# Patient Record
Sex: MALE | Race: WHITE | NOT HISPANIC OR LATINO
[De-identification: names, ages, dates, MRNs, and addresses within clinical notes are randomized per-mention and may not be internally consistent; named-entity substitution may affect disease eponyms.]

---

## 2024-05-20 ENCOUNTER — NON-APPOINTMENT (OUTPATIENT)
Age: 69
End: 2024-05-20

## 2024-05-22 ENCOUNTER — TRANSCRIPTION ENCOUNTER (OUTPATIENT)
Age: 69
End: 2024-05-22

## 2024-05-22 PROBLEM — Z00.00 ENCOUNTER FOR PREVENTIVE HEALTH EXAMINATION: Status: ACTIVE | Noted: 2024-05-22

## 2024-06-05 ENCOUNTER — APPOINTMENT (OUTPATIENT)
Dept: NEUROLOGY | Facility: CLINIC | Age: 69
End: 2024-06-05
Payer: MEDICARE

## 2024-06-05 ENCOUNTER — NON-APPOINTMENT (OUTPATIENT)
Age: 69
End: 2024-06-05

## 2024-06-05 VITALS
SYSTOLIC BLOOD PRESSURE: 126 MMHG | HEIGHT: 67 IN | OXYGEN SATURATION: 98 % | BODY MASS INDEX: 25.58 KG/M2 | WEIGHT: 163 LBS | HEART RATE: 50 BPM | DIASTOLIC BLOOD PRESSURE: 68 MMHG

## 2024-06-05 DIAGNOSIS — Z86.39 PERSONAL HISTORY OF OTHER ENDOCRINE, NUTRITIONAL AND METABOLIC DISEASE: ICD-10-CM

## 2024-06-05 DIAGNOSIS — G40.109 LOCALIZATION-RELATED (FOCAL) (PARTIAL) SYMPTOMATIC EPILEPSY AND EPILEPTIC SYNDROMES WITH SIMPLE PARTIAL SEIZURES, NOT INTRACTABLE, W/OUT STATUS EPILEPTICUS: ICD-10-CM

## 2024-06-05 PROCEDURE — 99204 OFFICE O/P NEW MOD 45 MIN: CPT

## 2024-06-05 RX ORDER — MIDAZOLAM 5 MG/.1ML
5 SPRAY NASAL
Qty: 1 | Refills: 0 | Status: ACTIVE | COMMUNITY
Start: 2024-06-05 | End: 1900-01-01

## 2024-06-05 RX ORDER — LEVETIRACETAM 750 MG/1
750 TABLET, FILM COATED ORAL
Qty: 120 | Refills: 1 | Status: ACTIVE | COMMUNITY
Start: 2024-06-05 | End: 1900-01-01

## 2024-06-05 RX ORDER — LEVETIRACETAM 750 MG/1
750 TABLET, FILM COATED ORAL
Qty: 180 | Refills: 0 | Status: DISCONTINUED | COMMUNITY
Start: 2024-06-05 | End: 2024-06-05

## 2024-06-06 PROBLEM — Z86.39 HISTORY OF HYPOTHYROIDISM: Status: RESOLVED | Noted: 2024-06-06 | Resolved: 2024-06-06

## 2024-06-06 PROBLEM — Z86.39 HISTORY OF HYPERLIPIDEMIA: Status: RESOLVED | Noted: 2024-06-06 | Resolved: 2024-06-06

## 2024-06-06 RX ORDER — MULTIVITAMIN
TABLET ORAL
Refills: 0 | Status: ACTIVE | COMMUNITY

## 2024-06-06 RX ORDER — SIMVASTATIN 20 MG/1
20 TABLET, FILM COATED ORAL
Refills: 0 | Status: ACTIVE | COMMUNITY

## 2024-06-06 RX ORDER — ASPIRIN 81 MG
81 TABLET, DELAYED RELEASE (ENTERIC COATED) ORAL
Refills: 0 | Status: ACTIVE | COMMUNITY

## 2024-06-06 RX ORDER — FENOFIBRATE 48 MG/1
48 TABLET ORAL
Refills: 0 | Status: ACTIVE | COMMUNITY

## 2024-06-06 RX ORDER — LEVOTHYROXINE SODIUM 0.07 MG/1
75 TABLET ORAL
Refills: 0 | Status: ACTIVE | COMMUNITY

## 2024-06-06 NOTE — DATA REVIEWED
[de-identified] : cEEG 5/20-5/21/24: Ambulatory EEG: 3 electroclinical seizures, two of left temporal onset one of right temporal onset.  Frequent right frontotemporal spikes and sharp waves. Frequent intermittent right frontotemporal polymorphic delta slowing.   [de-identified] : MRI brain w/o contrast: 5/21/24: No acute infarction. No gross evidence for cortical dysplasia, gray matter heterotopia, or mesial temporal sclerosis. Mild left hippocampal volume loss compared to contralateral side.

## 2024-06-06 NOTE — ASSESSMENT
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 Call if seizure [phone] and call if 911 if seizure greater than 5 minutes   Seizure Safety:  Wear a helmet when biking or horseback riding No unsupervised swimming Showers rather than baths - no bath alone - risk of drowning Adjust the temperature on hot water heater to avoid scalding If uncontrolled seizures, use microwave rather than stovetop Dont lock door in bathroom, bedroom or on places If fall off bed with seizures, try sleeping with mattress on the floor Use soft or padded furniture Avoid high ladders Take medication as prescribed Follow driving regulations of people with epilepsy. Please visit Epilepsy [facility] : [email or web address]

## 2024-06-06 NOTE — HISTORY OF PRESENT ILLNESS
[FreeTextEntry1] : Juan is a 68-year-old right-handed man with a past medical history of hyperlipidemia and hypothyroidism presenting for post-hospital follow-up after a first time focal seizure.  Per report of wife, he was in usual state of health on 5/20. He drove to a meeting, came home. Was out on the deck. He was speaking with her and suddenly froze. One hand (right hand per hospital notes) was shaking. This lasted for about one minute. He almost lost his balance. Eyes were open and he was making purposeless chewing movements with his mouth. No shaking was noted. He felt weak.  She thinks he may have had two episodes of staring and responding. Per [facility] ED notes, one episode on Mother's Day and another episode in March. These were less dramatic and wife did not make much of them.   Of note, he has had multiple arousals from sleep. He suddenly jerks up from sleep. Wife does not know if these were small seizures. Has been under stress. Retired.    He was admitted to [facility] from 5/20-5/22/24.   No history of major convulsions. No abnormal smell. No abnormal taste. No malinda vu.    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

## 2024-06-06 NOTE — PHYSICAL EXAM
[FreeTextEntry1] : Mental Status:  alert, oriented.  Language/Speech : speech fluent  Cranial Nerves  II: Pupils equal and reactive,  VFF full III, IV, VI: EOMI V : normal sensation in V1-V3 b/l VII : no asymmetry, no nasolabial fold flattening VIII: normal hearing to voice  IX, X: normal palatal elevation, uvula midline XI: 5/5 head turn and 5/5 shoulder shrug bilaterally XII : midline tongue protrusion Motor: no drift in limbs, normal bulk and tone, 5/5 in all extremities Sensory: normal to light touch, pinprick and vibration Reflexes: brachioradialis, biceps, triceps, patella and ankles - symmetric  Toes are down-going  Coordination: Normal finger to nose Gait: normal balance and gait, able to toe/heel/tandem

## 2024-06-18 ENCOUNTER — APPOINTMENT (OUTPATIENT)
Dept: NEUROLOGY | Facility: CLINIC | Age: 69
End: 2024-06-18
Payer: MEDICARE

## 2024-06-18 PROCEDURE — 95819 EEG AWAKE AND ASLEEP: CPT

## 2024-06-19 ENCOUNTER — APPOINTMENT (OUTPATIENT)
Dept: NEUROLOGY | Facility: CLINIC | Age: 69
End: 2024-06-19

## 2024-06-19 PROCEDURE — 95700 EEG CONT REC W/VID EEG TECH: CPT

## 2024-06-19 PROCEDURE — 95719 EEG PHYS/QHP EA INCR W/O VID: CPT

## 2024-06-19 PROCEDURE — 95708 EEG WO VID EA 12-26HR UNMNTR: CPT

## 2024-08-30 ENCOUNTER — RX RENEWAL (OUTPATIENT)
Age: 69
End: 2024-08-30

## 2024-10-07 ENCOUNTER — APPOINTMENT (OUTPATIENT)
Dept: NEUROLOGY | Facility: CLINIC | Age: 69
End: 2024-10-07
Payer: MEDICARE

## 2024-10-07 ENCOUNTER — NON-APPOINTMENT (OUTPATIENT)
Age: 69
End: 2024-10-07

## 2024-10-07 VITALS
SYSTOLIC BLOOD PRESSURE: 142 MMHG | DIASTOLIC BLOOD PRESSURE: 76 MMHG | WEIGHT: 163 LBS | HEIGHT: 67 IN | HEART RATE: 46 BPM | BODY MASS INDEX: 25.58 KG/M2 | OXYGEN SATURATION: 97 %

## 2024-10-07 PROCEDURE — 99214 OFFICE O/P EST MOD 30 MIN: CPT

## 2024-10-07 RX ORDER — LAMOTRIGINE 25 MG/1
25 TABLET ORAL
Qty: 240 | Refills: 0 | Status: ACTIVE | COMMUNITY
Start: 2024-10-07 | End: 1900-01-01

## 2024-11-12 ENCOUNTER — APPOINTMENT (OUTPATIENT)
Dept: NEUROLOGY | Facility: CLINIC | Age: 69
End: 2024-11-12

## 2024-11-12 PROCEDURE — 95816 EEG AWAKE AND DROWSY: CPT

## 2024-11-13 ENCOUNTER — APPOINTMENT (OUTPATIENT)
Dept: NEUROLOGY | Facility: CLINIC | Age: 69
End: 2024-11-13

## 2024-11-13 PROCEDURE — 95719 EEG PHYS/QHP EA INCR W/O VID: CPT

## 2024-11-13 PROCEDURE — 95708 EEG WO VID EA 12-26HR UNMNTR: CPT

## 2024-11-13 PROCEDURE — 95700 EEG CONT REC W/VID EEG TECH: CPT

## 2024-12-02 ENCOUNTER — NON-APPOINTMENT (OUTPATIENT)
Age: 69
End: 2024-12-02

## 2024-12-02 RX ORDER — LAMOTRIGINE 100 MG/1
100 TABLET ORAL
Qty: 60 | Refills: 3 | Status: ACTIVE | COMMUNITY
Start: 2024-12-02 | End: 1900-01-01

## 2024-12-12 ENCOUNTER — NON-APPOINTMENT (OUTPATIENT)
Age: 69
End: 2024-12-12

## 2024-12-12 RX ORDER — LAMOTRIGINE 25 MG/1
25 TABLET ORAL
Qty: 120 | Refills: 1 | Status: ACTIVE | COMMUNITY
Start: 2024-12-12 | End: 1900-01-01

## 2025-01-15 ENCOUNTER — NON-APPOINTMENT (OUTPATIENT)
Age: 70
End: 2025-01-15

## 2025-01-15 RX ORDER — LAMOTRIGINE 150 MG/1
150 TABLET ORAL TWICE DAILY
Qty: 180 | Refills: 3 | Status: ACTIVE | COMMUNITY
Start: 2025-01-15 | End: 1900-01-01

## 2025-01-28 ENCOUNTER — APPOINTMENT (OUTPATIENT)
Dept: NEUROLOGY | Facility: CLINIC | Age: 70
End: 2025-01-28
Payer: MEDICARE

## 2025-01-28 VITALS
BODY MASS INDEX: 25.43 KG/M2 | SYSTOLIC BLOOD PRESSURE: 135 MMHG | WEIGHT: 162 LBS | DIASTOLIC BLOOD PRESSURE: 71 MMHG | OXYGEN SATURATION: 98 % | HEART RATE: 62 BPM | HEIGHT: 67 IN

## 2025-01-28 DIAGNOSIS — E55.9 VITAMIN D DEFICIENCY, UNSPECIFIED: ICD-10-CM

## 2025-01-28 PROCEDURE — 99213 OFFICE O/P EST LOW 20 MIN: CPT

## 2025-03-11 ENCOUNTER — NON-APPOINTMENT (OUTPATIENT)
Age: 70
End: 2025-03-11

## 2025-04-12 ENCOUNTER — NON-APPOINTMENT (OUTPATIENT)
Age: 70
End: 2025-04-12

## 2025-04-14 ENCOUNTER — APPOINTMENT (OUTPATIENT)
Dept: PODIATRY | Facility: CLINIC | Age: 70
End: 2025-04-14
Payer: MEDICARE

## 2025-04-14 DIAGNOSIS — Z82.49 FAMILY HISTORY OF ISCHEMIC HEART DISEASE AND OTHER DISEASES OF THE CIRCULATORY SYSTEM: ICD-10-CM

## 2025-04-14 DIAGNOSIS — S93.401A SPRAIN OF UNSPECIFIED LIGAMENT OF RIGHT ANKLE, INITIAL ENCOUNTER: ICD-10-CM

## 2025-04-14 DIAGNOSIS — Z87.898 PERSONAL HISTORY OF OTHER SPECIFIED CONDITIONS: ICD-10-CM

## 2025-04-14 PROCEDURE — 99203 OFFICE O/P NEW LOW 30 MIN: CPT

## 2025-04-14 PROCEDURE — 73630 X-RAY EXAM OF FOOT: CPT | Mod: RT

## 2025-04-14 PROCEDURE — 73610 X-RAY EXAM OF ANKLE: CPT | Mod: RT

## 2025-05-19 ENCOUNTER — APPOINTMENT (OUTPATIENT)
Dept: NEUROLOGY | Facility: CLINIC | Age: 70
End: 2025-05-19
Payer: MEDICARE

## 2025-05-19 VITALS
BODY MASS INDEX: 26.94 KG/M2 | OXYGEN SATURATION: 95 % | DIASTOLIC BLOOD PRESSURE: 70 MMHG | WEIGHT: 172 LBS | HEART RATE: 52 BPM | SYSTOLIC BLOOD PRESSURE: 131 MMHG

## 2025-05-19 DIAGNOSIS — G40.109 LOCALIZATION-RELATED (FOCAL) (PARTIAL) SYMPTOMATIC EPILEPSY AND EPILEPTIC SYNDROMES WITH SIMPLE PARTIAL SEIZURES, NOT INTRACTABLE, W/OUT STATUS EPILEPTICUS: ICD-10-CM

## 2025-05-19 PROCEDURE — 99213 OFFICE O/P EST LOW 20 MIN: CPT
